# Patient Record
Sex: MALE | Race: BLACK OR AFRICAN AMERICAN | NOT HISPANIC OR LATINO | URBAN - METROPOLITAN AREA
[De-identification: names, ages, dates, MRNs, and addresses within clinical notes are randomized per-mention and may not be internally consistent; named-entity substitution may affect disease eponyms.]

---

## 2017-01-23 ENCOUNTER — EMERGENCY (EMERGENCY)
Facility: HOSPITAL | Age: 22
LOS: 0 days | Discharge: HOME | End: 2017-01-23

## 2017-06-27 DIAGNOSIS — R10.31 RIGHT LOWER QUADRANT PAIN: ICD-10-CM

## 2017-06-27 DIAGNOSIS — R11.10 VOMITING, UNSPECIFIED: ICD-10-CM

## 2021-08-10 PROBLEM — Z00.00 ENCOUNTER FOR PREVENTIVE HEALTH EXAMINATION: Status: ACTIVE | Noted: 2021-08-10

## 2021-08-13 ENCOUNTER — OUTPATIENT (OUTPATIENT)
Dept: OUTPATIENT SERVICES | Facility: HOSPITAL | Age: 26
LOS: 1 days | Discharge: HOME | End: 2021-08-13

## 2021-08-13 ENCOUNTER — APPOINTMENT (OUTPATIENT)
Dept: GASTROENTEROLOGY | Facility: CLINIC | Age: 26
End: 2021-08-13
Payer: COMMERCIAL

## 2021-08-13 ENCOUNTER — NON-APPOINTMENT (OUTPATIENT)
Age: 26
End: 2021-08-13

## 2021-08-13 VITALS
SYSTOLIC BLOOD PRESSURE: 120 MMHG | OXYGEN SATURATION: 99 % | DIASTOLIC BLOOD PRESSURE: 85 MMHG | HEART RATE: 69 BPM | HEIGHT: 70 IN | WEIGHT: 150 LBS | TEMPERATURE: 96.5 F | BODY MASS INDEX: 21.47 KG/M2

## 2021-08-13 DIAGNOSIS — Z72.89 OTHER PROBLEMS RELATED TO LIFESTYLE: ICD-10-CM

## 2021-08-13 DIAGNOSIS — R13.10 DYSPHAGIA, UNSPECIFIED: ICD-10-CM

## 2021-08-13 PROCEDURE — 99204 OFFICE O/P NEW MOD 45 MIN: CPT | Mod: GC

## 2021-08-13 RX ORDER — PANTOPRAZOLE 40 MG/1
40 TABLET, DELAYED RELEASE ORAL DAILY
Qty: 30 | Refills: 0 | Status: ACTIVE | COMMUNITY
Start: 2021-08-13 | End: 1900-01-01

## 2021-08-13 NOTE — HISTORY OF PRESENT ILLNESS
[Heartburn] : denies heartburn [Nausea] : denies nausea [Vomiting] : denies vomiting [Diarrhea] : denies diarrhea [Constipation] : denies constipation [Yellow Skin Or Eyes (Jaundice)] : denies jaundice [Abdominal Pain] : denies abdominal pain [Abdominal Swelling] : denies abdominal swelling [Hiatus Hernia] : no hiatus hernia [Peptic Ulcer Disease] : no peptic ulcer disease [de-identified] : 26 M no sig PMH p/w sensation of food getting stuck in his throat. This sensation has been going on for at least one month. It has not really gotten worse or better in that time. Associated weakness, but does not endorse any weight loss. Sensation is not associated with food or water intake, patient feels as if something stuck in his throat constantly. States it is more uncomfortable than painful. No N,V,D. No blood in stools, no hematesis, no melena, denies heartburn.

## 2021-08-13 NOTE — END OF VISIT
[] : Resident [FreeTextEntry3] : Patient with dysphagia to solids. Hx of eczema. EGD to RO EOE. barium esophagram

## 2021-08-13 NOTE — ASSESSMENT
[FreeTextEntry1] : 26 M no sig PMH p/w sensation of food getting stuck in his throat. This sensation has been going on for at least one month. no weight loss, not associated with PO intake, no N/V. \par  \par Impression\par Dysphagia\par \par Plan\par -can check Barium Swallow study\par -PPI\par -plan for EGD (r/o EoE)

## 2021-08-13 NOTE — PHYSICAL EXAM
[General Appearance - Alert] : alert [General Appearance - In No Acute Distress] : in no acute distress [General Appearance - Well Nourished] : well nourished [PERRL With Normal Accommodation] : pupils were equal in size, round, and reactive to light [Extraocular Movements] : extraocular movements were intact [Outer Ear] : the ears and nose were normal in appearance [Both Tympanic Membranes Were Examined] : both tympanic membranes were normal [Jugular Venous Distention Increased] : there was no jugular-venous distention [] : no respiratory distress [Apical Impulse] : the apical impulse was normal [Heart Sounds] : normal S1 and S2 [Bowel Sounds] : normal bowel sounds [Abdomen Soft] : soft [Abdomen Tenderness] : non-tender [Abnormal Walk] : normal gait [Nail Clubbing] : no clubbing  or cyanosis of the fingernails [Cranial Nerves] : cranial nerves 2-12 were intact [Sensation] : the sensory exam was normal to light touch and pinprick [Oriented To Time, Place, And Person] : oriented to person, place, and time [FreeTextEntry1] : dry skin over anterior chest

## 2021-08-13 NOTE — REVIEW OF SYSTEMS
[Sore Throat] : sore throat [Hoarseness] : hoarseness [Wheezing] : wheezing [Fever] : no fever [Feeling Poorly] : not feeling poorly [Recent Weight Loss (___ Lbs)] : no recent weight loss [Heart Rate Is Slow] : the heart rate was not slow [Heart Rate Is Fast] : the heart rate was not fast [Chest Pain] : no chest pain [Shortness Of Breath] : no shortness of breath [Cough] : no cough [Abdominal Pain] : no abdominal pain [Constipation] : no constipation [Diarrhea] : no diarrhea [Arthralgias] : no arthralgias [Joint Swelling] : no joint swelling

## 2021-08-25 LAB
ALBUMIN SERPL ELPH-MCNC: 4.5 G/DL
ALP BLD-CCNC: 63 U/L
ALT SERPL-CCNC: 18 U/L
ANION GAP SERPL CALC-SCNC: 12 MMOL/L
AST SERPL-CCNC: 28 U/L
BASOPHILS # BLD AUTO: 0.05 K/UL
BASOPHILS NFR BLD AUTO: 0.7 %
BILIRUB SERPL-MCNC: 0.5 MG/DL
BUN SERPL-MCNC: 11 MG/DL
CALCIUM SERPL-MCNC: 9.7 MG/DL
CHLORIDE SERPL-SCNC: 102 MMOL/L
CO2 SERPL-SCNC: 25 MMOL/L
CREAT SERPL-MCNC: 1.1 MG/DL
EOSINOPHIL # BLD AUTO: 0.24 K/UL
EOSINOPHIL NFR BLD AUTO: 3.3 %
GLUCOSE SERPL-MCNC: 69 MG/DL
HCT VFR BLD CALC: 47.4 %
HGB BLD-MCNC: 16.2 G/DL
IMM GRANULOCYTES NFR BLD AUTO: 0.1 %
INR PPP: 1.11 RATIO
LYMPHOCYTES # BLD AUTO: 3.58 K/UL
LYMPHOCYTES NFR BLD AUTO: 48.5 %
MAN DIFF?: NORMAL
MCHC RBC-ENTMCNC: 30.5 PG
MCHC RBC-ENTMCNC: 34.2 G/DL
MCV RBC AUTO: 89.1 FL
MONOCYTES # BLD AUTO: 0.53 K/UL
MONOCYTES NFR BLD AUTO: 7.2 %
NEUTROPHILS # BLD AUTO: 2.97 K/UL
NEUTROPHILS NFR BLD AUTO: 40.2 %
PLATELET # BLD AUTO: 282 K/UL
POTASSIUM SERPL-SCNC: 4.6 MMOL/L
PROT SERPL-MCNC: 7.2 G/DL
PT BLD: 12.7 SEC
RBC # BLD: 5.32 M/UL
RBC # FLD: 12.5 %
SODIUM SERPL-SCNC: 139 MMOL/L
WBC # FLD AUTO: 7.38 K/UL

## 2022-01-13 ENCOUNTER — TRANSCRIPTION ENCOUNTER (OUTPATIENT)
Age: 27
End: 2022-01-13

## 2022-01-13 ENCOUNTER — OUTPATIENT (OUTPATIENT)
Dept: OUTPATIENT SERVICES | Facility: HOSPITAL | Age: 27
LOS: 1 days | Discharge: HOME | End: 2022-01-13
Payer: COMMERCIAL

## 2022-01-13 ENCOUNTER — RESULT REVIEW (OUTPATIENT)
Age: 27
End: 2022-01-13

## 2022-01-13 VITALS — HEART RATE: 86 BPM | RESPIRATION RATE: 17 BRPM | DIASTOLIC BLOOD PRESSURE: 70 MMHG | SYSTOLIC BLOOD PRESSURE: 115 MMHG

## 2022-01-13 VITALS
SYSTOLIC BLOOD PRESSURE: 154 MMHG | TEMPERATURE: 98 F | OXYGEN SATURATION: 100 % | RESPIRATION RATE: 17 BRPM | HEART RATE: 75 BPM | HEIGHT: 69 IN | WEIGHT: 160.06 LBS | DIASTOLIC BLOOD PRESSURE: 93 MMHG

## 2022-01-13 PROCEDURE — 43239 EGD BIOPSY SINGLE/MULTIPLE: CPT

## 2022-01-13 PROCEDURE — 88305 TISSUE EXAM BY PATHOLOGIST: CPT | Mod: 26

## 2022-01-13 PROCEDURE — 88312 SPECIAL STAINS GROUP 1: CPT | Mod: 26

## 2022-01-13 NOTE — PRE-ANESTHESIA EVALUATION ADULT - NSANTHOSAYNRD_GEN_A_CORE
No. RONALDO screening performed.  STOP BANG Legend: 0-2 = LOW Risk; 3-4 = INTERMEDIATE Risk; 5-8 = HIGH Risk

## 2022-01-13 NOTE — H&P PST ADULT - HISTORY OF PRESENT ILLNESS
26 M no sig PMH p/w sensation of food getting stuck in his throat. This sensation has been going on for at least one month. It has not really gotten worse or better in that time. Associated weakness, but does not endorse any weight loss. Sensation is not associated with food or water intake, patient feels as if something stuck in his throat constantly. States it is more uncomfortable than painful. No N,V,D. No blood in stools, no hematesis, no melena, denies heartburn.   Symptoms: denies heartburn, denies nausea, denies vomiting, denies diarrhea, denies constipation, denies jaundice, denies abdominal pain and denies abdominal swelling.   Pertinent Medical History: no hiatus hernia and no peptic ulcer disease.

## 2022-01-13 NOTE — ASU PATIENT PROFILE, ADULT - FALL HARM RISK - HARM RISK INTERVENTIONS

## 2022-01-18 LAB — SURGICAL PATHOLOGY STUDY: SIGNIFICANT CHANGE UP

## 2022-01-19 DIAGNOSIS — K20.90 ESOPHAGITIS, UNSPECIFIED WITHOUT BLEEDING: ICD-10-CM

## 2022-01-19 DIAGNOSIS — R13.10 DYSPHAGIA, UNSPECIFIED: ICD-10-CM

## 2022-02-04 ENCOUNTER — APPOINTMENT (OUTPATIENT)
Dept: GASTROENTEROLOGY | Facility: CLINIC | Age: 27
End: 2022-02-04
Payer: COMMERCIAL

## 2022-02-04 DIAGNOSIS — Z78.9 OTHER SPECIFIED HEALTH STATUS: ICD-10-CM

## 2022-02-04 DIAGNOSIS — R19.8 OTHER SPECIFIED SYMPTOMS AND SIGNS INVOLVING THE DIGESTIVE SYSTEM AND ABDOMEN: ICD-10-CM

## 2022-02-04 PROCEDURE — 99442: CPT | Mod: GC

## 2022-02-04 NOTE — HISTORY OF PRESENT ILLNESS
[Home] : at home, [unfilled] , at the time of the visit. [Medical Office: ([facility])___] : at the medical office located in  [Verbal consent obtained from patient] : the patient, [unfilled] [de-identified] : a 26 M no sig PMH was contacted for globus sensation, that he has been experiencing for the last year. not always with eating, no dysphagia or odynophagia, no Nausea no vomiting, weight loss 10 lbs in 1 year\par  mildly improved with ppi daily that he has been taking after meals\par patient has hx of child asthma that is currently not active\par \par s/p EGD, was on PPI, Normal mucosa in the whole esophagus. lower esophagus: rare intra ep eosinophils, mid esophagus: no eosinophils \par  Erythema in the stomach compatible with non-erosive gastritis. HP neg \par  Normal mucosa in the whole examined duodenum.

## 2022-02-22 ENCOUNTER — NON-APPOINTMENT (OUTPATIENT)
Age: 27
End: 2022-02-22

## 2023-10-04 NOTE — REVIEW OF SYSTEMS
[Fever] : no fever [Chills] : no chills [Eyesight Problems] : no eyesight problems [Discharge From Eyes] : no purulent discharge from the eyes [Nosebleeds] : no nosebleeds [Chest Pain] : no chest pain [Palpitations] : no palpitations [Cough] : no cough [SOB on Exertion] : no shortness of breath during exertion Wound care

## 2023-11-13 NOTE — ASU DISCHARGE PLAN (ADULT/PEDIATRIC) - PROCEDURE
EGD Daily Dosage (Cgy): 270.48 Additional Change To Daily Dosage Administered Mid Treatment?: No Total Cumulative Dose (Cgy): 2704.80 Treatment Documentation: This patient has been treated today with image-guided superficial radiation therapy for non-melanoma skin cancer. Written informed consent has been previously obtained from this patient for this treatment. This consent is documented in the patient’s chart. The patient gave verbal consent to continue treatment today. The patient was treated with a specific radiation dose and setup as prescribed by the provider listed on this visit note. A Radiation Therapist performed administration of radiation under the supervision of a provider. The treatment parameters and cumulative dose are indicated above. Prior to administering the radiation, the patient underwent a verification therapeutic radiology simulation-aided field setting defining relevant normal and abnormal target anatomy in addition to data necessary to develop an optimal radiation treatment process for the patient. The field placement simulation documents any change seen in overall tumor volume documented in the patient’s record, allows the clinician to indicate any needed changes in the treatment plan and/or prescription, provides diagnostic evaluation as the basis for performing the therapeutic procedure, and clearly identifies the information needed to decide to proceed with the therapeutic procedure. This process includes verification of the treatment port(s) and proper treatment positioning. All treatment ports were photographed within electronic medical records. The patient’s lead blocking along with gross tumor volume and margin was confirmed. Considering superficial radiotherapy is clinical in setup, this requires the physician and radiation therapist to clarify the location interest being treated against initial images, pathology, and patient anatomy. Care was taken to ensure brizuela treated were geometrically accurate and properly positioned using therapeutic radiology simulation-aided field setting verification per\\n \\nfraction. This process is also utilized to determine if any prescription or setup changes are necessary. These steps are therefore medically necessary to ensure safe and effective administration of radiation. Ongoing therapeutic radiology simulation-aided field setting verification is ordered throughout the course of therapy. Per Dr. Stuart, continued therapeutic radiology simulation-aided field setting verification per fraction is required for field placement. Per Dr. Stuart, continued ultrasound guidance will continue on OTV treatment days which is required for, measurement of tumor depth, width, breadth, tumor progress, whether to proceed with therapeutic delivery, and acute effect monitoring. Bill For Treatment ([id number])?: Yes Prescription Used: 1 Calculate Total Cumulative Dose Automatically Or Manually: Manually Ultrasound Used Text: High frequency ultrasound depth is mm, which is mm in difference from previous imaging. Fraction Number: 10 Energy (Kv): 70 Ultrasound Not Used Text: Ultrasound was not performed today due to